# Patient Record
Sex: MALE | URBAN - METROPOLITAN AREA
[De-identification: names, ages, dates, MRNs, and addresses within clinical notes are randomized per-mention and may not be internally consistent; named-entity substitution may affect disease eponyms.]

---

## 2020-06-25 ENCOUNTER — OFFICE VISIT (OUTPATIENT)
Dept: URBAN - METROPOLITAN AREA CLINIC 13 | Facility: CLINIC | Age: 60
End: 2020-06-25

## 2020-06-26 ENCOUNTER — OFFICE VISIT (OUTPATIENT)
Dept: URBAN - METROPOLITAN AREA CLINIC 113 | Facility: CLINIC | Age: 60
End: 2020-06-26

## 2020-07-25 ENCOUNTER — TELEPHONE ENCOUNTER (OUTPATIENT)
Dept: URBAN - METROPOLITAN AREA CLINIC 13 | Facility: CLINIC | Age: 60
End: 2020-07-25

## 2020-07-26 ENCOUNTER — TELEPHONE ENCOUNTER (OUTPATIENT)
Dept: URBAN - METROPOLITAN AREA CLINIC 13 | Facility: CLINIC | Age: 60
End: 2020-07-26

## 2020-07-26 RX ORDER — FINASTERIDE 1 MG/1
TAKE 1 TABLET DAILY TABLET, FILM COATED ORAL
Refills: 0 | Status: ACTIVE | COMMUNITY

## 2020-07-26 RX ORDER — PREDNISONE 10 MG/1
TAKE AS DIRECTED ON PACKAGE TABLET ORAL
Qty: 21 | Refills: 0 | Status: ACTIVE | COMMUNITY
Start: 2019-09-10

## 2020-07-26 RX ORDER — OMEPRAZOLE 20 MG/1
TAKE 1 TABLET DAILY TABLET, DELAYED RELEASE ORAL
Refills: 3 | Status: ACTIVE | COMMUNITY

## 2020-07-26 RX ORDER — TRIAMCINOLONE ACETONIDE 1 MG/G
APPLY THIN LAYER TWICE A DAY TO AFFECTED AREA X 7 DAYS CREAM TOPICAL
Qty: 15 | Refills: 0 | Status: ACTIVE | COMMUNITY
Start: 2019-09-10

## 2020-07-26 RX ORDER — AMITRIPTYLINE HYDROCHLORIDE 10 MG/1
TAKE 1 TABLET AT BEDTIME TABLET, FILM COATED ORAL
Qty: 90 | Refills: 3 | Status: ACTIVE | COMMUNITY

## 2024-08-28 ENCOUNTER — OFFICE VISIT (OUTPATIENT)
Dept: URBAN - METROPOLITAN AREA CLINIC 107 | Facility: CLINIC | Age: 64
End: 2024-08-28
Payer: COMMERCIAL

## 2024-08-28 ENCOUNTER — DASHBOARD ENCOUNTERS (OUTPATIENT)
Age: 64
End: 2024-08-28

## 2024-08-28 VITALS
DIASTOLIC BLOOD PRESSURE: 59 MMHG | SYSTOLIC BLOOD PRESSURE: 115 MMHG | BODY MASS INDEX: 21.59 KG/M2 | HEIGHT: 69 IN | HEART RATE: 57 BPM | WEIGHT: 145.8 LBS | TEMPERATURE: 97.7 F

## 2024-08-28 DIAGNOSIS — K57.30 DIVERTICULA, COLON: ICD-10-CM

## 2024-08-28 DIAGNOSIS — Z86.010 HISTORY OF COLON POLYPS: ICD-10-CM

## 2024-08-28 DIAGNOSIS — R14.3 EXCESSIVE GAS: ICD-10-CM

## 2024-08-28 PROBLEM — 397881000: Status: ACTIVE | Noted: 2024-08-28

## 2024-08-28 PROBLEM — 428283002: Status: ACTIVE | Noted: 2024-08-28

## 2024-08-28 PROBLEM — 733657002: Status: ACTIVE | Noted: 2024-08-28

## 2024-08-28 PROCEDURE — 99204 OFFICE O/P NEW MOD 45 MIN: CPT | Performed by: NURSE PRACTITIONER

## 2024-08-28 RX ORDER — FINASTERIDE 1 MG/1
TAKE 1 TABLET DAILY TABLET, FILM COATED ORAL
Refills: 0 | Status: ACTIVE | COMMUNITY

## 2024-08-28 RX ORDER — AMITRIPTYLINE HYDROCHLORIDE 10 MG/1
TAKE 1 TABLET AT BEDTIME TABLET, FILM COATED ORAL
Qty: 90 | Refills: 3 | Status: ACTIVE | COMMUNITY

## 2024-08-28 RX ORDER — TRIAMCINOLONE ACETONIDE 1 MG/G
APPLY THIN LAYER TWICE A DAY TO AFFECTED AREA X 7 DAYS CREAM TOPICAL
Qty: 15 | Refills: 0 | Status: ON HOLD | COMMUNITY
Start: 2019-09-10

## 2024-08-28 RX ORDER — OMEPRAZOLE 20 MG/1
TAKE 1 TABLET DAILY TABLET, DELAYED RELEASE ORAL
Refills: 3 | Status: ACTIVE | COMMUNITY

## 2024-08-28 RX ORDER — PREDNISONE 10 MG/1
TAKE AS DIRECTED ON PACKAGE TABLET ORAL
Qty: 21 | Refills: 0 | Status: ON HOLD | COMMUNITY
Start: 2019-09-10

## 2024-08-28 NOTE — HPI-TODAY'S VISIT:
63-year-old male here for diverticulitis, abdominal bloating and gas.    Past medical history of chronic lymphocytic leukemia, splenomegaly, migraines.   Referred to cardiology in July by his primary care for possible A-fib noted on one of his heart monitoring devices. Colonoscopy by Dr. Duran at [facility] 10/9/2023 revealed diverticulosis affecting the length of the colon more prominent descending and rectosigmoid colon.  Hemorrhoidal veins.  Was discussed if he developed any further attacks to consider elective prophylactic surgery. CT abdomen and pelvis with contrast 7/22/2023 revealed inflamed diverticulum of the distal descending colon consistent with diverticulitis without abscess.  Small amount of free fluid in the pelvis. Abdominal ultrasound 2/26/2024 revealed splenomegaly measuring 13.7 x 13.8 x 6.7.  No masses. Labs 12/29/2023.  CBC revealed WBC count 27.1, platelet 122. Labs 3/29/2024.  CBC revealed WBC 31.1.  Hemoglobin normal at 15.4, platelet 133.  Vitamin D normal.  In the end of May he started feeling poorly. He was driving and was not able to make the full trip due to his unpredictable bowel habits so took some magnesium citrate for his gas and bloating.  Symptoms returned, started omeprazole 20 mg end of June by PCP, was then increased to 40 mg.  He is since of the omeprazole.  Had Augmentin prescribed in July by PCP.  It helped. 1 week ago he has experienced some bloating and tightness in his abdomen similar to prior events.  Increased gas production and abdominal gurgling, worse at night. He eats plums nightly.  No melena or hematochezia.  States symptoms overall mid-abdomen and lower no upper abdominal discomfort, GERD, nausea or vomiting.  Was told not to have any more CT scans by his doctors previously.  He would like to avoid if at all possible and get a MRI if needed instead